# Patient Record
Sex: FEMALE
[De-identification: names, ages, dates, MRNs, and addresses within clinical notes are randomized per-mention and may not be internally consistent; named-entity substitution may affect disease eponyms.]

---

## 2023-12-12 PROBLEM — Z00.00 ENCOUNTER FOR PREVENTIVE HEALTH EXAMINATION: Status: ACTIVE | Noted: 2023-12-12

## 2024-01-11 ENCOUNTER — APPOINTMENT (OUTPATIENT)
Dept: OBGYN | Facility: CLINIC | Age: 34
End: 2024-01-11
Payer: COMMERCIAL

## 2024-01-11 VITALS
BODY MASS INDEX: 22.98 KG/M2 | DIASTOLIC BLOOD PRESSURE: 60 MMHG | WEIGHT: 143 LBS | HEIGHT: 66 IN | SYSTOLIC BLOOD PRESSURE: 98 MMHG

## 2024-01-11 DIAGNOSIS — D21.9 BENIGN NEOPLASM OF CONNECTIVE AND OTHER SOFT TISSUE, UNSPECIFIED: ICD-10-CM

## 2024-01-11 DIAGNOSIS — N83.299 OTHER OVARIAN CYST, UNSPECIFIED SIDE: ICD-10-CM

## 2024-01-11 DIAGNOSIS — Z78.9 OTHER SPECIFIED HEALTH STATUS: ICD-10-CM

## 2024-01-11 DIAGNOSIS — Z31.89 ENCOUNTER FOR OTHER PROCREATIVE MANAGEMENT: ICD-10-CM

## 2024-01-11 PROCEDURE — 99203 OFFICE O/P NEW LOW 30 MIN: CPT

## 2024-01-11 NOTE — DISCUSSION/SUMMARY
[FreeTextEntry1] : counselling for ovarian cyst    she will have sonogram if not pregnant in the first week of her next cycle   Discuss functional versus non functional cyst of ovary   Cancer discussed although not likely

## 2024-01-11 NOTE — HISTORY OF PRESENT ILLNESS
[FreeTextEntry1] : G0   Menstrual cycles have been regular     Q 25-28   five to six days bleeding  LMP  January 3 normal 5 day period.     December 7 November 23  light for three days   November 8  3-4 days short    Last GYN March  normal    She is interested in pregnancy    She stopped using condom in October